# Patient Record
Sex: FEMALE | Race: WHITE | NOT HISPANIC OR LATINO | URBAN - NONMETROPOLITAN AREA
[De-identification: names, ages, dates, MRNs, and addresses within clinical notes are randomized per-mention and may not be internally consistent; named-entity substitution may affect disease eponyms.]

---

## 2024-03-28 ENCOUNTER — OV NP (OUTPATIENT)
Dept: URBAN - NONMETROPOLITAN AREA CLINIC 13 | Facility: CLINIC | Age: 64
End: 2024-03-28
Payer: COMMERCIAL

## 2024-03-28 ENCOUNTER — LAB (OUTPATIENT)
Dept: URBAN - NONMETROPOLITAN AREA CLINIC 13 | Facility: CLINIC | Age: 64
End: 2024-03-28

## 2024-03-28 VITALS
SYSTOLIC BLOOD PRESSURE: 120 MMHG | HEART RATE: 92 BPM | BODY MASS INDEX: 24.29 KG/M2 | WEIGHT: 132 LBS | HEIGHT: 62 IN | DIASTOLIC BLOOD PRESSURE: 76 MMHG

## 2024-03-28 DIAGNOSIS — R11.0 NAUSEA: ICD-10-CM

## 2024-03-28 DIAGNOSIS — K21.9 GASTROESOPHAGEAL REFLUX DISEASE, UNSPECIFIED WHETHER ESOPHAGITIS PRESENT: ICD-10-CM

## 2024-03-28 DIAGNOSIS — K59.09 CHRONIC CONSTIPATION: ICD-10-CM

## 2024-03-28 PROBLEM — 235595009: Status: ACTIVE | Noted: 2024-03-28

## 2024-03-28 PROCEDURE — 99204 OFFICE O/P NEW MOD 45 MIN: CPT | Performed by: NURSE PRACTITIONER

## 2024-03-28 RX ORDER — LEVOTHYROXINE SODIUM 50 UG/1
TAKE ONE TABLET BY MOUTH EVERY MORNING ON AN EMPTY STOMACH TABLET ORAL
Qty: 90 UNSPECIFIED | Refills: 0 | Status: ACTIVE | COMMUNITY

## 2024-03-28 RX ORDER — OMEPRAZOLE 40 MG/1
1 CAPSULE 30 MINUTES BEFORE MORNING MEAL AND EVENING MEAL CAPSULE, DELAYED RELEASE ORAL TWICE DAILY
Qty: 60 | Refills: 5 | OUTPATIENT
Start: 2024-03-28

## 2024-03-28 RX ORDER — VENLAFAXINE HYDROCHLORIDE 150 MG/1
TAKE ONE CAPSULE BY MOUTH ONE TIME DAILY CAPSULE, EXTENDED RELEASE ORAL
Qty: 90 UNSPECIFIED | Refills: 0 | Status: ACTIVE | COMMUNITY

## 2024-05-09 NOTE — HPI-TODAY'S VISIT:
Ms. Mary Carbajal is a 63-year-old female who presents today for GERD and constipation.  She reports progressive indigestion over the last year and a half.  She also notes regurgitation in the evening and dysphagia when she eats chicken.  She denies any melena or hematemesis.  She has tried several lifestyle modifications with minimal improvement in her symptoms.  Tums and famotidine were not helpful.  She feels as though food "sits undigested in her stomach ".  She also reports lifelong constipation.  Had a colonoscopy around 5 years ago in [place] that was reportedly normal with recommendation to repeat in 5 years.  She has tried as needed MiraLAX that does not help induce a bowel movement, but when she takes it every day she has loose stools with urgency.  She denies hematochezia or abdominal cramping.  She is hesitant to be on medications that she will have to take long-term. LG
no

## 2024-05-31 ENCOUNTER — OFFICE VISIT (OUTPATIENT)
Dept: URBAN - NONMETROPOLITAN AREA SURGERY CENTER 1 | Facility: SURGERY CENTER | Age: 64
End: 2024-05-31

## 2024-06-27 ENCOUNTER — OFFICE VISIT (OUTPATIENT)
Dept: URBAN - NONMETROPOLITAN AREA CLINIC 13 | Facility: CLINIC | Age: 64
End: 2024-06-27
Payer: COMMERCIAL

## 2024-06-27 ENCOUNTER — DASHBOARD ENCOUNTERS (OUTPATIENT)
Age: 64
End: 2024-06-27

## 2024-06-27 ENCOUNTER — TELEPHONE ENCOUNTER (OUTPATIENT)
Dept: URBAN - NONMETROPOLITAN AREA CLINIC 2 | Facility: CLINIC | Age: 64
End: 2024-06-27

## 2024-06-27 ENCOUNTER — LAB OUTSIDE AN ENCOUNTER (OUTPATIENT)
Dept: URBAN - NONMETROPOLITAN AREA CLINIC 13 | Facility: CLINIC | Age: 64
End: 2024-06-27

## 2024-06-27 VITALS
BODY MASS INDEX: 23.85 KG/M2 | SYSTOLIC BLOOD PRESSURE: 109 MMHG | WEIGHT: 129.6 LBS | HEIGHT: 62 IN | HEART RATE: 85 BPM | DIASTOLIC BLOOD PRESSURE: 72 MMHG

## 2024-06-27 DIAGNOSIS — K21.9 GASTROESOPHAGEAL REFLUX DISEASE, UNSPECIFIED WHETHER ESOPHAGITIS PRESENT: ICD-10-CM

## 2024-06-27 DIAGNOSIS — R11.0 NAUSEA: ICD-10-CM

## 2024-06-27 DIAGNOSIS — K59.09 CHRONIC CONSTIPATION: ICD-10-CM

## 2024-06-27 PROCEDURE — 99213 OFFICE O/P EST LOW 20 MIN: CPT | Performed by: NURSE PRACTITIONER

## 2024-06-27 RX ORDER — VENLAFAXINE HYDROCHLORIDE 150 MG/1
TAKE ONE CAPSULE BY MOUTH ONE TIME DAILY CAPSULE, EXTENDED RELEASE ORAL
Qty: 90 UNSPECIFIED | Refills: 0 | Status: ACTIVE | COMMUNITY

## 2024-06-27 RX ORDER — LEVOTHYROXINE SODIUM 50 UG/1
TAKE ONE TABLET BY MOUTH EVERY MORNING ON AN EMPTY STOMACH TABLET ORAL
Qty: 90 UNSPECIFIED | Refills: 0 | Status: ACTIVE | COMMUNITY

## 2024-06-27 RX ORDER — POLYETHYLENE GLYCOL 3350, SODIUM SULFATE, POTASSIUM CHLORIDE, MAGNESIUM SULFATE, AND SODIUM CHLORIDE FOR ORAL SOLUTION 178.7-7.3G
178ML KIT ORAL
Qty: 178 ML | Refills: 0 | OUTPATIENT
Start: 2024-06-27 | End: 2024-06-29

## 2024-06-27 RX ORDER — OMEPRAZOLE 40 MG/1
1 CAPSULE 30 MINUTES BEFORE MORNING MEAL AND EVENING MEAL CAPSULE, DELAYED RELEASE ORAL TWICE DAILY
Qty: 60 | Refills: 5 | Status: ACTIVE | COMMUNITY
Start: 2024-03-28

## 2024-06-27 RX ORDER — ESTRADIOL 0.1 MG/G
INSERT ONE GRAM VAGINALLY TWICE A WEEK CREAM VAGINAL
Qty: 42.5 UNSPECIFIED | Refills: 0 | Status: ACTIVE | COMMUNITY

## 2024-06-27 NOTE — HPI-TODAY'S VISIT:
Ms. Mary Carbajal is a 63-year-old female who presents today for follow-up of GERD, nausea, and chronic constipation.  Since her EGD where she was dilated she has felt significant improvement in her upper GI symptoms.  She elected not to have the gastric emptying study as her nausea resolved.  She is only taking PPI once daily.  She is still struggling with inconsistent stools.  She had 2 episodes of incontinence.  She has not taken her MiraLAX consistently.  Continues to deny any hematochezia nor weight loss.  LG.

## 2024-09-09 ENCOUNTER — OFFICE VISIT (OUTPATIENT)
Dept: URBAN - NONMETROPOLITAN AREA SURGERY CENTER 1 | Facility: SURGERY CENTER | Age: 64
End: 2024-09-09

## 2024-10-07 ENCOUNTER — CLAIMS CREATED FROM THE CLAIM WINDOW (OUTPATIENT)
Dept: URBAN - NONMETROPOLITAN AREA SURGERY CENTER 1 | Facility: SURGERY CENTER | Age: 64
End: 2024-10-07
Payer: COMMERCIAL

## 2024-10-07 DIAGNOSIS — K59.00 ACUTE CONSTIPATION: ICD-10-CM

## 2024-10-07 DIAGNOSIS — Z86.0100 PERSONAL HISTORY OF COLON POLYPS, UNSPECIFIED: ICD-10-CM

## 2024-10-07 DIAGNOSIS — Z12.11 ENCOUNTER FOR COLONOSCOPY DUE TO HISTORY OF COLONIC POLYP: ICD-10-CM

## 2024-10-07 PROCEDURE — 45378 DIAGNOSTIC COLONOSCOPY: CPT | Performed by: INTERNAL MEDICINE

## 2024-10-07 PROCEDURE — 00812 ANES LWR INTST SCR COLSC: CPT | Performed by: NURSE ANESTHETIST, CERTIFIED REGISTERED

## 2024-10-10 ENCOUNTER — OFFICE VISIT (OUTPATIENT)
Dept: URBAN - NONMETROPOLITAN AREA CLINIC 13 | Facility: CLINIC | Age: 64
End: 2024-10-10

## 2024-10-10 ENCOUNTER — TELEPHONE ENCOUNTER (OUTPATIENT)
Dept: URBAN - NONMETROPOLITAN AREA CLINIC 13 | Facility: CLINIC | Age: 64
End: 2024-10-10

## 2024-10-14 ENCOUNTER — TELEPHONE ENCOUNTER (OUTPATIENT)
Dept: URBAN - NONMETROPOLITAN AREA CLINIC 2 | Facility: CLINIC | Age: 64
End: 2024-10-14

## 2024-10-14 ENCOUNTER — LAB OUTSIDE AN ENCOUNTER (OUTPATIENT)
Dept: URBAN - NONMETROPOLITAN AREA CLINIC 2 | Facility: CLINIC | Age: 64
End: 2024-10-14

## 2024-10-31 ENCOUNTER — TELEPHONE ENCOUNTER (OUTPATIENT)
Dept: URBAN - NONMETROPOLITAN AREA CLINIC 2 | Facility: CLINIC | Age: 64
End: 2024-10-31

## 2024-10-31 ENCOUNTER — OFFICE VISIT (OUTPATIENT)
Dept: URBAN - NONMETROPOLITAN AREA CLINIC 13 | Facility: CLINIC | Age: 64
End: 2024-10-31

## 2024-10-31 VITALS
BODY MASS INDEX: 23.89 KG/M2 | HEIGHT: 62 IN | WEIGHT: 129.8 LBS | HEART RATE: 91 BPM | DIASTOLIC BLOOD PRESSURE: 75 MMHG | SYSTOLIC BLOOD PRESSURE: 111 MMHG

## 2024-10-31 PROBLEM — 440630006: Status: ACTIVE | Noted: 2024-10-31

## 2024-10-31 RX ORDER — ESTRADIOL 0.1 MG/G
INSERT ONE GRAM VAGINALLY TWICE A WEEK CREAM VAGINAL
Qty: 42.5 UNSPECIFIED | Refills: 0 | Status: ACTIVE | COMMUNITY

## 2024-10-31 RX ORDER — LEVOTHYROXINE SODIUM 50 UG/1
TAKE ONE TABLET BY MOUTH EVERY MORNING ON AN EMPTY STOMACH TABLET ORAL
Qty: 90 UNSPECIFIED | Refills: 0 | Status: ACTIVE | COMMUNITY

## 2024-10-31 RX ORDER — VENLAFAXINE HYDROCHLORIDE 150 MG/1
TAKE ONE CAPSULE BY MOUTH ONE TIME DAILY CAPSULE, EXTENDED RELEASE ORAL
Qty: 90 UNSPECIFIED | Refills: 0 | Status: ACTIVE | COMMUNITY

## 2024-10-31 RX ORDER — OMEPRAZOLE 40 MG/1
1 CAPSULE 30 MINUTES BEFORE MORNING MEAL AND EVENING MEAL CAPSULE, DELAYED RELEASE ORAL TWICE DAILY
Qty: 60 | Refills: 5 | Status: ACTIVE | COMMUNITY
Start: 2024-03-28

## 2024-10-31 NOTE — HPI-TODAY'S VISIT:
Ms. Mary Carbajal is a 64-year-old female who presents today for follow-up of GERD, nausea, and chronic constipation.  GERD continues to be well-controlled as does nausea.  She had a colonoscopy since her last visit where significant looping was noted.  It was otherwise normal.  She had significant constipation following the procedure.  Osmotic laxatives have made her stools are too loose and she has episodes of incontinence without complete stools.  We will try Motegrity.  LG.

## 2024-10-31 NOTE — HPI-OTHER HISTORIES
6/27/2024: Ms. Mary Carbajal is a 63-year-old female who presents today for follow-up of GERD, nausea, and chronic constipation. Since her EGD where she was dilated she has felt significant improvement in her upper GI symptoms. She elected not to have the gastric emptying study as her nausea resolved. She is only taking PPI once daily. She is still struggling with inconsistent stools. She had 2 episodes of incontinence. She has not taken her MiraLAX consistently. Continues to deny any hematochezia nor weight loss. LG.

## 2024-11-01 ENCOUNTER — TELEPHONE ENCOUNTER (OUTPATIENT)
Dept: URBAN - NONMETROPOLITAN AREA CLINIC 13 | Facility: CLINIC | Age: 64
End: 2024-11-01

## 2024-11-01 ENCOUNTER — TELEPHONE ENCOUNTER (OUTPATIENT)
Dept: URBAN - NONMETROPOLITAN AREA CLINIC 2 | Facility: CLINIC | Age: 64
End: 2024-11-01

## 2024-11-14 ENCOUNTER — TELEPHONE ENCOUNTER (OUTPATIENT)
Dept: URBAN - NONMETROPOLITAN AREA CLINIC 13 | Facility: CLINIC | Age: 64
End: 2024-11-14

## 2024-11-19 ENCOUNTER — TELEPHONE ENCOUNTER (OUTPATIENT)
Dept: URBAN - NONMETROPOLITAN AREA CLINIC 13 | Facility: CLINIC | Age: 64
End: 2024-11-19

## 2024-11-19 RX ORDER — LUBIPROSTONE 8 UG/1
1 CAPSULE WITH FOOD AND WATER CAPSULE, GELATIN COATED ORAL TWICE A DAY
Qty: 60 | OUTPATIENT
Start: 2024-11-20 | End: 2024-12-20

## 2024-12-10 ENCOUNTER — TELEPHONE ENCOUNTER (OUTPATIENT)
Dept: URBAN - NONMETROPOLITAN AREA CLINIC 13 | Facility: CLINIC | Age: 64
End: 2024-12-10

## 2024-12-10 RX ORDER — LACTULOSE 10 G/15ML
15 ML AS NEEDED SOLUTION ORAL ONCE A DAY
Qty: 450 | Refills: 3 | OUTPATIENT
Start: 2024-12-11 | End: 2025-04-10